# Patient Record
Sex: FEMALE | Race: WHITE | NOT HISPANIC OR LATINO | URBAN - METROPOLITAN AREA
[De-identification: names, ages, dates, MRNs, and addresses within clinical notes are randomized per-mention and may not be internally consistent; named-entity substitution may affect disease eponyms.]

---

## 2018-07-11 ENCOUNTER — OFFICE VISIT (OUTPATIENT)
Dept: URBAN - METROPOLITAN AREA CLINIC 23 | Facility: CLINIC | Age: 63
End: 2018-07-11
Payer: COMMERCIAL

## 2018-07-11 PROCEDURE — 92014 COMPRE OPH EXAM EST PT 1/>: CPT | Performed by: OPHTHALMOLOGY

## 2018-07-11 PROCEDURE — 92133 CPTRZD OPH DX IMG PST SGM ON: CPT | Performed by: OPHTHALMOLOGY

## 2018-07-11 RX ORDER — TIMOLOL MALEATE 5 MG/ML
0.5 % SOLUTION/ DROPS OPHTHALMIC
Qty: 15 | Refills: 5 | Status: INACTIVE
Start: 2018-07-11 | End: 2019-07-09

## 2018-07-11 RX ORDER — LATANOPROST 50 UG/ML
0.005 % SOLUTION OPHTHALMIC
Qty: 5 | Refills: 5 | Status: INACTIVE
Start: 2018-07-11 | End: 2019-04-22

## 2018-07-11 RX ORDER — BRIMONIDINE TARTRATE 2 MG/ML
0.2 % SOLUTION/ DROPS OPHTHALMIC
Qty: 15 | Refills: 0 | Status: INACTIVE
Start: 2018-07-11 | End: 2018-07-11

## 2018-07-11 RX ORDER — BRIMONIDINE TARTRATE 2 MG/ML
0.2 % SOLUTION/ DROPS OPHTHALMIC
Qty: 15 | Refills: 0 | Status: INACTIVE
Start: 2018-07-11 | End: 2018-09-21

## 2018-07-11 ASSESSMENT — INTRAOCULAR PRESSURE
OD: 22
OS: 22

## 2018-07-11 NOTE — IMPRESSION/PLAN
Impression: Diagnosis: Other specified glaucoma. Code: H40.89. S/p Avasti inj OD 4/15 w/ Dr Jolie Rosado S/p PRP for DME-
IOP borderline ou - patient not using gtts everyday-
aqueous shunt OD. Plan: Discussed diagnosis, IOP, ONH, glaucoma management and risks. OCT ordered and reviewed advising results show progression ou.
start latanoprost ou qhs.
stressed compliance with glaucoma meds since patient reports non-compliance with gtts. Continue Timolol BID OU, Brimonidine BID OU. Will continue to monitor condition and symptoms.

## 2018-08-28 ENCOUNTER — OFFICE VISIT (OUTPATIENT)
Dept: URBAN - METROPOLITAN AREA CLINIC 29 | Facility: CLINIC | Age: 63
End: 2018-08-28
Payer: COMMERCIAL

## 2018-08-28 PROCEDURE — 99213 OFFICE O/P EST LOW 20 MIN: CPT | Performed by: OPHTHALMOLOGY

## 2018-08-28 PROCEDURE — 92083 EXTENDED VISUAL FIELD XM: CPT | Performed by: OPHTHALMOLOGY

## 2018-08-28 ASSESSMENT — INTRAOCULAR PRESSURE
OD: 18
OS: 18

## 2018-08-28 NOTE — IMPRESSION/PLAN
Impression: Diagnosis: Other specified glaucoma. Code: H40.89. S/p Avastin inj OD 4/15 w/ Dr Florecita Oliver S/p PRP for DME-- aqueous shunt OD. IOP doing well ou - Plan: Discussed diagnosis, IOP, ONH, glaucoma management and risks. visual field ordered and reviewed with patient. continue latanoprost ou qhs. Continue Timolol BID OU, Brimonidine BID OU. stressed compliance with glaucoma meds - Will continue to monitor condition and symptoms.

## 2019-01-08 ENCOUNTER — OFFICE VISIT (OUTPATIENT)
Dept: URBAN - METROPOLITAN AREA CLINIC 29 | Facility: CLINIC | Age: 64
End: 2019-01-08
Payer: COMMERCIAL

## 2019-01-08 PROCEDURE — 99213 OFFICE O/P EST LOW 20 MIN: CPT | Performed by: OPHTHALMOLOGY

## 2019-01-08 ASSESSMENT — INTRAOCULAR PRESSURE
OS: 19
OD: 19

## 2019-01-08 NOTE — IMPRESSION/PLAN
Impression: Other specified glaucoma. Code: H40.89. Aqueous shunt OD Plan: Discussed diagnosis, IOP, ONH, glaucoma management and risks. Continue latanoprost ou qhs, Timolol BID OU, and Brimonidine BID OU. Will continue to monitor condition and symptoms.

## 2019-05-28 ENCOUNTER — OFFICE VISIT (OUTPATIENT)
Dept: URBAN - METROPOLITAN AREA CLINIC 29 | Facility: CLINIC | Age: 64
End: 2019-05-28
Payer: COMMERCIAL

## 2019-05-28 PROCEDURE — 99213 OFFICE O/P EST LOW 20 MIN: CPT | Performed by: OPHTHALMOLOGY

## 2019-05-28 RX ORDER — PREDNISOLONE ACETATE 10 MG/ML
1 % SUSPENSION/ DROPS OPHTHALMIC
Qty: 1 | Refills: 0 | Status: INACTIVE
Start: 2019-05-28 | End: 2019-07-09

## 2019-05-28 ASSESSMENT — INTRAOCULAR PRESSURE
OS: 19
OD: 17

## 2019-05-28 NOTE — IMPRESSION/PLAN
Impression: Other specified glaucoma. Code: H40.89. Aqueous shunt OD
IOP elevated OS Plan: Discussed diagnosis, explained and understood by patient. Discussed IOP/ONH/Glaucoma management and risks. Continue latanoprost ou qhs, Timolol BID OU, and Brimonidine BID OU. Will continue to monitor condition and symptoms. Recommend Trabeculoplasty (ALT) OS o possibly lower IOP. Patient elects ALT . Discussed RBA's of laser trabeculoplasty . RL=2 start prednisolone qid x 7 days after laser procedure.

## 2019-07-03 ENCOUNTER — SURGERY (OUTPATIENT)
Dept: URBAN - METROPOLITAN AREA SURGERY 11 | Facility: SURGERY | Age: 64
End: 2019-07-03
Payer: COMMERCIAL

## 2019-07-03 PROCEDURE — 65855 TRABECULOPLASTY LASER SURG: CPT | Performed by: OPHTHALMOLOGY

## 2019-07-09 ENCOUNTER — POST-OPERATIVE VISIT (OUTPATIENT)
Dept: URBAN - METROPOLITAN AREA CLINIC 29 | Facility: CLINIC | Age: 64
End: 2019-07-09

## 2019-07-09 DIAGNOSIS — Z09 ENCNTR FOR F/U EXAM AFT TRTMT FOR COND OTH THAN MALIG NEOPLM: Primary | ICD-10-CM

## 2019-07-09 PROCEDURE — 99024 POSTOP FOLLOW-UP VISIT: CPT | Performed by: OPTOMETRIST

## 2019-07-09 RX ORDER — TIMOLOL MALEATE 5 MG/ML
0.5 % SOLUTION/ DROPS OPHTHALMIC
Qty: 15 | Refills: 5 | Status: INACTIVE
Start: 2019-07-09 | End: 2019-07-15

## 2019-07-09 RX ORDER — LATANOPROST 50 UG/ML
0.005 % SOLUTION OPHTHALMIC
Qty: 1 | Refills: 12 | Status: INACTIVE
Start: 2019-07-09 | End: 2020-07-24

## 2019-07-09 RX ORDER — BRIMONIDINE TARTRATE 2 MG/ML
0.2 % SOLUTION/ DROPS OPHTHALMIC
Qty: 15 | Refills: 11 | Status: INACTIVE
Start: 2019-07-09 | End: 2020-07-24

## 2019-07-09 ASSESSMENT — INTRAOCULAR PRESSURE
OD: 16
OS: 20

## 2019-07-30 ENCOUNTER — OFFICE VISIT (OUTPATIENT)
Dept: URBAN - METROPOLITAN AREA CLINIC 29 | Facility: CLINIC | Age: 64
End: 2019-07-30
Payer: COMMERCIAL

## 2019-07-30 PROCEDURE — 99213 OFFICE O/P EST LOW 20 MIN: CPT | Performed by: OPHTHALMOLOGY

## 2019-07-30 ASSESSMENT — INTRAOCULAR PRESSURE
OS: 12
OD: 11

## 2019-07-30 NOTE — IMPRESSION/PLAN
Impression: Other specified glaucoma. Code: H40.89. Aqueous shunt OD -
S/P ALT OS 7/3/19 - IOP doing well ou - ONH stable ou Plan: Discussed diagnosis, explained and understood by patient. Discussed IOP/ONH/Glaucoma management and risks. Continue latanoprost ou qhs, Timolol BID OU, and Brimonidine BID OU. Will continue to monitor condition and symptoms.

## 2019-11-21 ENCOUNTER — TESTING ONLY (OUTPATIENT)
Dept: URBAN - METROPOLITAN AREA CLINIC 29 | Facility: CLINIC | Age: 64
End: 2019-11-21
Payer: COMMERCIAL

## 2019-11-21 PROCEDURE — 92083 EXTENDED VISUAL FIELD XM: CPT | Performed by: OPHTHALMOLOGY

## 2019-11-21 PROCEDURE — 92133 CPTRZD OPH DX IMG PST SGM ON: CPT | Performed by: OPHTHALMOLOGY

## 2019-11-22 ENCOUNTER — OFFICE VISIT (OUTPATIENT)
Dept: URBAN - METROPOLITAN AREA CLINIC 29 | Facility: CLINIC | Age: 64
End: 2019-11-22
Payer: COMMERCIAL

## 2019-11-22 PROCEDURE — 99213 OFFICE O/P EST LOW 20 MIN: CPT | Performed by: OPHTHALMOLOGY

## 2019-11-22 ASSESSMENT — INTRAOCULAR PRESSURE
OD: 16
OS: 16

## 2019-11-22 NOTE — IMPRESSION/PLAN
Impression: Other specified glaucoma. Code: H40.89. Aqueous shunt OD 
ALT OS 7/3/19 IOP/ONH stable OU Sees Dr. Avery Mock for retina Plan: Discussed diagnosis, explained and understood by patient. Discussed IOP/ONH/Glaucoma management and risks. VF and OCT ordered and reviewed today. Continue latanoprost ou qhs, Timolol BID OU, and Brimonidine BID OU. Will continue to monitor condition and symptoms.

## 2020-07-24 ENCOUNTER — OFFICE VISIT (OUTPATIENT)
Dept: URBAN - METROPOLITAN AREA CLINIC 29 | Facility: CLINIC | Age: 65
End: 2020-07-24
Payer: COMMERCIAL

## 2020-07-24 PROCEDURE — 99213 OFFICE O/P EST LOW 20 MIN: CPT | Performed by: OPHTHALMOLOGY

## 2020-07-24 RX ORDER — TIMOLOL MALEATE 5 MG/ML
0.5 % SOLUTION/ DROPS OPHTHALMIC
Qty: 15 | Refills: 11 | Status: INACTIVE
Start: 2020-07-24 | End: 2020-07-24

## 2020-07-24 RX ORDER — BRIMONIDINE TARTRATE 2 MG/ML
0.2 % SOLUTION/ DROPS OPHTHALMIC
Qty: 15 | Refills: 11 | Status: INACTIVE
Start: 2020-07-24 | End: 2020-08-03

## 2020-07-24 RX ORDER — TIMOLOL MALEATE 5 MG/ML
0.5 % SOLUTION/ DROPS OPHTHALMIC
Qty: 15 | Refills: 11 | Status: INACTIVE
Start: 2020-07-24 | End: 2020-08-03

## 2020-07-24 RX ORDER — LATANOPROST 50 UG/ML
0.005 % SOLUTION OPHTHALMIC
Qty: 1 | Refills: 12 | Status: INACTIVE
Start: 2020-07-24 | End: 2021-06-23

## 2020-07-24 ASSESSMENT — INTRAOCULAR PRESSURE
OD: 17
OS: 14

## 2020-07-24 NOTE — IMPRESSION/PLAN
Impression: Other specified glaucoma: H40.89 OU. Aqueous shunt OD 
ALT OS 7/3/19 IOP/ONH stable OU Sees Dr. Macario Nur for retina Patient has congestive heart failure (H40.89). Plan: Discussed diagnosis, explained and understood by patient. Discussed IOP/ONH/Glaucoma management and risks. Continue latanoprost ou qhs, timolol BID OU, and brimonidine BID OU. Will continue to monitor condition and symptoms.  Discussed with patient Timolol may have change in heart continue to follow up with Cardiologist.

## 2020-10-19 ENCOUNTER — OFFICE VISIT (OUTPATIENT)
Dept: URBAN - METROPOLITAN AREA CLINIC 41 | Facility: CLINIC | Age: 65
End: 2020-10-19
Payer: COMMERCIAL

## 2020-10-19 PROCEDURE — 92014 COMPRE OPH EXAM EST PT 1/>: CPT | Performed by: OPHTHALMOLOGY

## 2020-10-19 PROCEDURE — 92134 CPTRZ OPH DX IMG PST SGM RTA: CPT | Performed by: OPHTHALMOLOGY

## 2020-10-19 ASSESSMENT — INTRAOCULAR PRESSURE
OD: 18
OS: 17

## 2020-10-19 NOTE — IMPRESSION/PLAN
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 OU.
s/p Avastin OD 10/22/2015 OS 7/27/17
s/p Focal OS 9/8/16, 12/22/16
s/p Eylea OD 3/18/19 OS 6/25/20, OU 10/7/19
s/p PPV/MP for ERM 1/27/16 OD
OCT= IRF OD with  -29 , ST IRF OS with  Plan: OD: stable/fluctuating edema - ? VA prog - improving without treatment = obs
OS: stable non-CI edema OS post treatment October 2019 RBAC's of treat prn vs treat standing vs treat and extend d/w patient. Patient elects prn = obs OU 3m OCT OU re-eval Eylea OU

## 2020-12-29 ENCOUNTER — OFFICE VISIT (OUTPATIENT)
Dept: URBAN - METROPOLITAN AREA CLINIC 29 | Facility: CLINIC | Age: 65
End: 2020-12-29
Payer: COMMERCIAL

## 2020-12-29 PROCEDURE — 99214 OFFICE O/P EST MOD 30 MIN: CPT | Performed by: OPHTHALMOLOGY

## 2020-12-29 PROCEDURE — 92083 EXTENDED VISUAL FIELD XM: CPT | Performed by: OPHTHALMOLOGY

## 2020-12-29 PROCEDURE — 92133 CPTRZD OPH DX IMG PST SGM ON: CPT | Performed by: OPHTHALMOLOGY

## 2020-12-29 ASSESSMENT — INTRAOCULAR PRESSURE
OS: 16
OD: 20

## 2020-12-29 NOTE — IMPRESSION/PLAN
Impression: Other specified glaucoma: H40.89 OU. Aqueous shunt OD 
ALT OS 7/3/19 IOP/ONH elevated OD, stable oS Sees Dr. [place] for retina Patient has congestive heart failure (H40.89). Plan: Discussed diagnosis, explained and understood by patient. Discussed IOP/ONH/Glaucoma management and risks. VF and OCT ordered and reviewed today shows no progression OU. Continue latanoprost ou qhs, timolol BID OU, and brimonidine BID OU. Will continue to monitor condition and symptoms. Discussed adding drops vs laser. Patient elects SLT. Recommend Trabeculoplasty (SLT) OD  to possibly lower IOP. Discussed RBA's of laser trabeculoplasty .  RL=2

## 2021-01-13 ENCOUNTER — SURGERY (OUTPATIENT)
Dept: URBAN - METROPOLITAN AREA SURGERY 11 | Facility: SURGERY | Age: 66
End: 2021-01-13
Payer: COMMERCIAL

## 2021-01-13 PROCEDURE — 65855 TRABECULOPLASTY LASER SURG: CPT | Performed by: OPHTHALMOLOGY

## 2021-01-21 ENCOUNTER — POST-OPERATIVE VISIT (OUTPATIENT)
Dept: URBAN - METROPOLITAN AREA CLINIC 29 | Facility: CLINIC | Age: 66
End: 2021-01-21
Payer: COMMERCIAL

## 2021-01-21 DIAGNOSIS — Z48.810 ENCOUNTER FOR SURGICAL AFTERCARE FOLLOWING SURGERY ON A SENSE ORGAN: Primary | ICD-10-CM

## 2021-01-21 PROCEDURE — 99024 POSTOP FOLLOW-UP VISIT: CPT | Performed by: OPTOMETRIST

## 2021-01-21 ASSESSMENT — INTRAOCULAR PRESSURE
OD: 16
OS: 17

## 2021-01-21 NOTE — IMPRESSION/PLAN
Impression: S/P SLT (Selective Laser Trabeculoplasty) OD - 8 Days. Encounter for surgical aftercare following surgery on a sense organ  Z48.810.  Post operative instructions reviewed - Plan: --Continue all glaucoma meds

## 2021-02-23 ENCOUNTER — OFFICE VISIT (OUTPATIENT)
Dept: URBAN - METROPOLITAN AREA CLINIC 29 | Facility: CLINIC | Age: 66
End: 2021-02-23
Payer: COMMERCIAL

## 2021-02-23 DIAGNOSIS — H40.89 OTHER SPECIFIED GLAUCOMA: Primary | ICD-10-CM

## 2021-02-23 PROCEDURE — 99213 OFFICE O/P EST LOW 20 MIN: CPT | Performed by: OPHTHALMOLOGY

## 2021-02-23 ASSESSMENT — INTRAOCULAR PRESSURE
OS: 18
OD: 17

## 2021-02-23 NOTE — IMPRESSION/PLAN
Impression: Other specified glaucoma: H40.89 OU. Aqueous shunt OD 
ALT OS 7/3/19 IOP/ONH elevated OD, stable oS Sees Dr. Pierce Jorge for retina Patient has congestive heart failure (H40.89).
s/p 01/13/2021 OD Plan: Discussed diagnosis, explained and understood by patient. Discussed IOP/ONH/Glaucoma management and risks. IOP stable OU s/p laser OD, Continue latanoprost ou qhs, timolol BID OU, and brimonidine BID OU. Will continue to monitor condition and symptoms.

## 2021-06-15 ENCOUNTER — OFFICE VISIT (OUTPATIENT)
Dept: URBAN - METROPOLITAN AREA CLINIC 29 | Facility: CLINIC | Age: 66
End: 2021-06-15
Payer: COMMERCIAL

## 2021-06-15 PROCEDURE — 99213 OFFICE O/P EST LOW 20 MIN: CPT | Performed by: OPHTHALMOLOGY

## 2021-06-15 ASSESSMENT — INTRAOCULAR PRESSURE
OD: 17
OS: 17

## 2021-06-15 NOTE — IMPRESSION/PLAN
Impression: Other specified glaucoma: H40.89 OU. Aqueous shunt OD 
ALT OS 7/3/19 IOP/ONH elevated OD, stable oS Sees Dr. Yvrose Hector for retina Patient has congestive heart failure SLT OD 01/13/2021 Plan: Discussed diagnosis, explained and understood by patient. Discussed IOP/ONH/Glaucoma management and risks. Continue latanoprost ou qhs, timolol BID OU, and brimonidine BID OU. Will continue to monitor condition and symptoms.

## 2021-08-23 ENCOUNTER — OFFICE VISIT (OUTPATIENT)
Dept: URBAN - METROPOLITAN AREA CLINIC 41 | Facility: CLINIC | Age: 66
End: 2021-08-23
Payer: COMMERCIAL

## 2021-08-23 DIAGNOSIS — H43.392 OTHER VITREOUS OPACITIES, LEFT EYE: ICD-10-CM

## 2021-08-23 DIAGNOSIS — E11.3513 TYPE 2 DIAB WITH PROLIF DIAB RTNOP WITH MACULAR EDEMA, BI: Primary | ICD-10-CM

## 2021-08-23 PROCEDURE — 99214 OFFICE O/P EST MOD 30 MIN: CPT | Performed by: OPHTHALMOLOGY

## 2021-08-23 PROCEDURE — 92134 CPTRZ OPH DX IMG PST SGM RTA: CPT | Performed by: OPHTHALMOLOGY

## 2021-08-23 ASSESSMENT — INTRAOCULAR PRESSURE
OS: 8
OD: 9

## 2021-08-23 NOTE — IMPRESSION/PLAN
Impression: Type 2 diab with prolif diab rtnop with macular edema, bi: [id number]. OU.
s/p Eylea OD 3/18/19 OS 6/25/20, OU 10/7/19
s/p Avastin OD 10/22/2015, OS 7/27/17
s/p Focal OS 9/8/16, 12/22/16
s/p PPV/MP for ERM 1/27/16 OD
OCT= IRF OD, ST IRF OS  / 228  Plan: OD: stable/fluctuating edema - VA prog - improving without treatment = obs
OS: stable non-CI edema OS post treatment October 2019 RBAC's of treat prn vs treat standing vs treat and extend d/w patient. 
Patient elects prn = obs OU

3-6m OCT OU re-eval Eylea OU

## 2021-08-23 NOTE — IMPRESSION/PLAN
Impression: Puckering of macula, left eye: H35.372. Plan: There is an epiretinal membrane (ERM), but the patient is doing well with their current vision and it is their better eye, and thus we will observe the ERM for now. We did discuss the natural history as well as the risks and benefits of observation vs. vitrectomy surgery. The patient will call if they experience decreased vision or increased metamorphopsia.

## 2021-11-04 ENCOUNTER — OFFICE VISIT (OUTPATIENT)
Dept: URBAN - METROPOLITAN AREA CLINIC 28 | Facility: CLINIC | Age: 66
End: 2021-11-04
Payer: COMMERCIAL

## 2021-11-04 PROCEDURE — 92083 EXTENDED VISUAL FIELD XM: CPT | Performed by: OPHTHALMOLOGY

## 2021-11-04 PROCEDURE — 92133 CPTRZD OPH DX IMG PST SGM ON: CPT | Performed by: OPHTHALMOLOGY

## 2021-11-04 PROCEDURE — 99214 OFFICE O/P EST MOD 30 MIN: CPT | Performed by: OPHTHALMOLOGY

## 2021-11-04 ASSESSMENT — INTRAOCULAR PRESSURE
OD: 19
OS: 19

## 2022-02-03 NOTE — IMPRESSION/PLAN
Impression: Other specified glaucoma: H40.89 OU. Aqueous shunt OD 
ALT OS 7/3/19 IOP/ONH elevated OD, stable oS Sees Dr. Jolie Rosado for retina Patient has congestive heart failure SLT OD 01/13/2021 Plan: Discussed diagnosis, explained and understood by patient. Discussed IOP/ONH/Glaucoma management and risks. Continue latanoprost ou qhs, timolol BID OU, and brimonidine BID OU. Will continue to monitor condition and symptoms. Complex Repair And Graft Additional Text (Will Appearing After The Standard Complex Repair Text): The complex repair was not sufficient to completely close the primary defect. The remaining additional defect was repaired with the graft mentioned below.

## 2022-02-25 ENCOUNTER — OFFICE VISIT (OUTPATIENT)
Dept: URBAN - METROPOLITAN AREA CLINIC 41 | Facility: CLINIC | Age: 67
End: 2022-02-25
Payer: COMMERCIAL

## 2022-02-25 DIAGNOSIS — Z96.1 PRESENCE OF INTRAOCULAR LENS: ICD-10-CM

## 2022-02-25 DIAGNOSIS — H35.372 PUCKERING OF MACULA, LEFT EYE: ICD-10-CM

## 2022-02-25 PROCEDURE — 92134 CPTRZ OPH DX IMG PST SGM RTA: CPT | Performed by: OPHTHALMOLOGY

## 2022-02-25 PROCEDURE — 99214 OFFICE O/P EST MOD 30 MIN: CPT | Performed by: OPHTHALMOLOGY

## 2022-02-25 ASSESSMENT — INTRAOCULAR PRESSURE
OD: 15
OS: 13

## 2022-02-25 NOTE — IMPRESSION/PLAN
Impression: Type 2 diab with prolif diab rtnop with macular edema, bi: [id number]. OU.
s/p Eylea OD 3/18/19 OS 6/25/20, OU 10/7/19
s/p Avastin OD 10/22/2015, OS 7/27/17
s/p Focal OS 9/8/16, 12/22/16
s/p PPV/MP for ERM 1/27/16 OD
OCT= no IRF OU mild ERM OS  / 171 Plan: OD: stable/fluctuating edema - VA prog - improving without treatment = obs
OS: stable non-CI edema OS post treatment October 2019 RBAC's of treat prn vs treat standing vs treat and extend d/w patient. 
Patient elects prn = obs OU

3-6m OCT OU re-eval Eylea OU

## 2022-07-20 ENCOUNTER — OFFICE VISIT (OUTPATIENT)
Dept: URBAN - METROPOLITAN AREA CLINIC 27 | Facility: CLINIC | Age: 67
End: 2022-07-20
Payer: COMMERCIAL

## 2022-07-20 DIAGNOSIS — H35.372 PUCKERING OF MACULA, LEFT EYE: ICD-10-CM

## 2022-07-20 DIAGNOSIS — H04.123 DRY EYE SYNDROME OF BILATERAL LACRIMAL GLANDS: ICD-10-CM

## 2022-07-20 DIAGNOSIS — E11.3513 TYPE 2 DIAB WITH PROLIF DIAB RTNOP WITH MACULAR EDEMA, BI: Primary | ICD-10-CM

## 2022-07-20 DIAGNOSIS — Z96.1 PRESENCE OF INTRAOCULAR LENS: ICD-10-CM

## 2022-07-20 DIAGNOSIS — H43.392 OTHER VITREOUS OPACITIES, LEFT EYE: ICD-10-CM

## 2022-07-20 PROCEDURE — 99214 OFFICE O/P EST MOD 30 MIN: CPT | Performed by: OPHTHALMOLOGY

## 2022-07-20 PROCEDURE — 92134 CPTRZ OPH DX IMG PST SGM RTA: CPT | Performed by: OPHTHALMOLOGY

## 2022-07-20 ASSESSMENT — INTRAOCULAR PRESSURE
OD: 11
OS: 9

## 2022-07-20 NOTE — IMPRESSION/PLAN
Impression: h/o DME, OD
s/p Eylea Plan: Exam and OCT reveal no DME OD. Observe.  

Return in 3 months for follow up with [id number]